# Patient Record
Sex: FEMALE | Race: WHITE | NOT HISPANIC OR LATINO | Employment: FULL TIME | URBAN - METROPOLITAN AREA
[De-identification: names, ages, dates, MRNs, and addresses within clinical notes are randomized per-mention and may not be internally consistent; named-entity substitution may affect disease eponyms.]

---

## 2018-09-04 ENCOUNTER — CLINICAL SUPPORT (OUTPATIENT)
Dept: URGENT CARE | Facility: CLINIC | Age: 23
End: 2018-09-04

## 2018-09-04 DIAGNOSIS — Z02.83 ENCOUNTER FOR DRUG SCREENING: Primary | ICD-10-CM

## 2018-09-04 PROCEDURE — 80305 DRUG TEST PRSMV DIR OPT OBS: CPT | Mod: S$GLB,,, | Performed by: PHYSICIAN ASSISTANT

## 2019-01-16 ENCOUNTER — OFFICE VISIT (OUTPATIENT)
Dept: FAMILY MEDICINE | Facility: CLINIC | Age: 24
End: 2019-01-16
Payer: OTHER GOVERNMENT

## 2019-01-16 VITALS
OXYGEN SATURATION: 99 % | HEART RATE: 80 BPM | HEIGHT: 64 IN | WEIGHT: 128.38 LBS | SYSTOLIC BLOOD PRESSURE: 126 MMHG | DIASTOLIC BLOOD PRESSURE: 84 MMHG | BODY MASS INDEX: 21.92 KG/M2

## 2019-01-16 DIAGNOSIS — M54.50 ACUTE RIGHT-SIDED LOW BACK PAIN WITHOUT SCIATICA: Primary | ICD-10-CM

## 2019-01-16 DIAGNOSIS — M62.838 MUSCLE SPASM: ICD-10-CM

## 2019-01-16 PROCEDURE — 99203 OFFICE O/P NEW LOW 30 MIN: CPT | Mod: ,,, | Performed by: NURSE PRACTITIONER

## 2019-01-16 PROCEDURE — 99203 PR OFFICE/OUTPT VISIT, NEW, LEVL III, 30-44 MIN: ICD-10-PCS | Mod: ,,, | Performed by: NURSE PRACTITIONER

## 2019-01-16 RX ORDER — IBUPROFEN 800 MG/1
800 TABLET ORAL 3 TIMES DAILY
Qty: 30 TABLET | Refills: 1 | Status: SHIPPED | OUTPATIENT
Start: 2019-01-16

## 2019-01-16 RX ORDER — TRAZODONE HYDROCHLORIDE 50 MG/1
50 TABLET ORAL NIGHTLY
COMMUNITY
End: 2019-03-27

## 2019-01-16 RX ORDER — CYCLOBENZAPRINE HCL 5 MG
5 TABLET ORAL NIGHTLY
Qty: 20 TABLET | Refills: 0 | Status: SHIPPED | OUTPATIENT
Start: 2019-01-16 | End: 2019-01-26

## 2019-01-16 RX ORDER — IBUPROFEN 200 MG
200 TABLET ORAL EVERY 6 HOURS PRN
COMMUNITY
End: 2019-01-16

## 2019-01-16 NOTE — PATIENT INSTRUCTIONS
Back Care Tips    Caring for your back  These are things you can do to prevent a recurrence of acute back pain and to reduce symptoms from chronic back pain:  · Maintain a healthy weight. If you are overweight, losing weight will help most types of back pain.  · Exercise is an important part of recovery from most types of back pain. The muscles behind and in front of the spine support the back. This means strengthening both the back muscles and the abdominal muscles will provide better support for your spine.   · Swimming and brisk walking are good overall exercises to improve your fitness level.  · Practice safe lifting methods (below).  · Practice good posture when sitting, standing and walking. Avoid prolonged sitting. This puts more stress on the lower back than standing or walking.  · Wear quality shoes with sufficient arch support. Foot and ankle alignment can affect back symptoms. Women should avoid wearing high heels.  · Therapeutic massage can help relax the back muscles without stretching them.  · During the first 24 to 72 hours after an acute injury or flare-up of chronic back pain, apply an ice pack to the painful area for 20 minutes and then remove it for 20 minutes, over a period of 60 to 90 minutes, or several times a day. As a safety precaution, do not use a heating pad at bedtime. Sleeping on a heating pad can lead to skin burns or tissue damage.  · You can alternate ice and heat therapies.  Medications  Talk to your healthcare provider before using medicines, especially if you have other medical problems or are taking other medicines.  · You may use acetaminophen or ibuprofen to control pain, unless your healthcare provider prescribed other pain medicine. If you have chronic conditions like diabetes, liver or kidney disease, stomach ulcers, or gastrointestinal bleeding, or are taking blood thinners, talk with your healthcare provider before taking any medicines.  · Be careful if you are given  prescription pain medicines, narcotics, or medicine for muscle spasm. They can cause drowsiness, affect your coordination, reflexes, and judgment. Do not drive or operate heavy machinery while taking these types of medicines. Take prescription pain medicine only as prescribed by your healthcare provider.  Lumbar stretch  Here is a simple stretching exercise that will help relax muscle spasm and keep your back more limber. If exercise makes your back pain worse, dont do it.  · Lie on your back with your knees bent and both feet on the ground.  · Slowly raise your left knee to your chest as you flatten your lower back against the floor. Hold for 5 seconds.  · Relax and repeat the exercise with your right knee.  · Do 10 of these exercises for each leg.  Safe lifting method  · Dont bend over at the waist to lift an object off the floor.  Instead, bend your knees and hips in a squat.   · Keep your back and head upright  · Hold the object close to your body, directly in front of you.  · Straighten your legs to lift the object.   · Lower the object to the floor in the reverse fashion.  · If you must slide something across the floor, push it.  Posture tips  Sitting  Sit in chairs with straight backs or low-back support. Keep your knees lower than your hips, with your feet flat on the floor.  When driving, sit up straight. Adjust the seat forward so you are not leaning toward the steering wheel.  A small pillow or rolled towel behind your lower back may help if you are driving long distances.   Standing  When standing for long periods, shift most of your weight to one leg at a time. Alternate legs every few minutes.   Sleeping  The best way to sleep is on your side with your knees bent. Put a low pillow under your head to support your neck in a neutral spine position. Avoid thick pillows that bend your neck to one side. Put a pillow between your legs to further relax your lower back. If you sleep on your back, put pillows  under your knees to support your legs in a slightly flexed position. Use a firm mattress. If your mattress sags, replace it, or use a 1/2-inch plywood board under the mattress to add support.  Follow-up care  Follow up with your healthcare provider, or as advised.  If X-rays, a CT scan or an MRI scan were taken, they will be reviewed by a radiologist. You will be notified of any new findings that may affect your care.  Call 911  Seek emergency medical care if any of the following occur:  · Trouble breathing  · Confusion  · Very drowsy  · Fainting or loss of consciousness  · Rapid or very slow heart rate  · Loss of  bowel or bladder control  When to seek medical care  Call your healthcare provider if any of the following occur:  · Pain becomes worse or spreads to your arms or legs  · Weakness or numbness in one or both arms or legs  · Numbness in the groin area  Date Last Reviewed: 6/1/2016  © 4070-0155 [employer]. [address] [place] [address], [place] [address]. All rights reserved. This information is not intended as a substitute for professional medical care. Always follow your healthcare professional's instructions.

## 2019-01-16 NOTE — PROGRESS NOTES
Subjective:       Patient ID: Jeannette Pope is a 23 y.o. female.    Chief Complaint: Back Pain    Back Pain   This is a new problem. The current episode started 1 to 4 weeks ago (2 weeks). The problem occurs 2 to 4 times per day. The problem has been waxing and waning since onset. The pain is present in the lumbar spine. The quality of the pain is described as shooting and aching. The pain is at a severity of 4/10. The pain is moderate. The symptoms are aggravated by twisting and sitting. Pertinent negatives include no abdominal pain, bladder incontinence, bowel incontinence, chest pain, dysuria, fever, headaches, leg pain, numbness, paresis, paresthesias, pelvic pain, perianal numbness, tingling, weakness or weight loss. 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 She has tried NSAIDs, chiropractic manipulation, heat and bed rest for the symptoms. The treatment provided mild relief.   Spasms   This is a new problem. The current episode started 1 to 4 weeks ago. The problem occurs daily. The problem has been waxing and waning. Associated symptoms include myalgias. Pertinent negatives include no abdominal pain, arthralgias, chest pain, congestion, coughing, fever, headaches, nausea, numbness, rash, sore throat, vomiting or weakness. The symptoms are aggravated by bending and exertion (lifting, twisting). She has tried rest, sleep, position changes, relaxation, NSAIDs, lying down and heat for the symptoms. The treatment provided mild relief.     Review of Systems   Constitutional: Negative for activity change, appetite change, fever and weight loss.        Bmi 22.04   HENT: Negative for congestion, ear discharge, ear pain, sore throat, trouble swallowing and voice change.    Eyes: Negative for photophobia, pain, discharge and visual disturbance.   Respiratory: Negative for cough, chest tightness and shortness of breath.    Cardiovascular: Negative for chest pain and palpitations.   Gastrointestinal: Negative for  abdominal pain, bowel incontinence, nausea and vomiting.   Endocrine: Negative for cold intolerance and heat intolerance.   Genitourinary: Negative for bladder incontinence, difficulty urinating, dysuria and pelvic pain.   Musculoskeletal: Positive for back pain and myalgias. Negative for arthralgias and gait problem.   Skin: Negative for rash.   Allergic/Immunologic: Negative for immunocompromised state.   Neurological: Negative for tingling, speech difficulty, weakness, numbness, headaches and paresthesias.   Psychiatric/Behavioral: Negative for confusion, self-injury and suicidal ideas.       Past Medical History:   Diagnosis Date    Anxiety     Bilateral ovarian cysts 01/16/2019    Migraines 01/16/2019    History reviewed. No pertinent surgical history.    Family History   Problem Relation Age of Onset    Hypertension Mother     Cancer Mother     Hypertension Father        Social History     Socioeconomic History    Marital status: Single     Spouse name: None    Number of children: None    Years of education: None    Highest education level: None   Social Needs    Financial resource strain: None    Food insecurity - worry: None    Food insecurity - inability: None    Transportation needs - medical: None    Transportation needs - non-medical: None   Occupational History    None   Tobacco Use    Smoking status: Never Smoker    Smokeless tobacco: Never Used   Substance and Sexual Activity    Alcohol use: Yes     Alcohol/week: 1.2 oz     Types: 1 Glasses of wine, 1 Shots of liquor per week    Drug use: No    Sexual activity: Yes     Partners: Male     Birth control/protection: IUD   Other Topics Concern    None   Social History Narrative    None       Current Outpatient Medications   Medication Sig Dispense Refill    traZODone (DESYREL) 50 MG tablet Take 50 mg by mouth every evening.      cyclobenzaprine (FLEXERIL) 5 MG tablet Take 1 tablet (5 mg total) by mouth nightly. for 10 days 20  "tablet 0    ibuprofen (ADVIL,MOTRIN) 800 MG tablet Take 1 tablet (800 mg total) by mouth 3 (three) times daily. 30 tablet 1     No current facility-administered medications for this visit.        Review of patient's allergies indicates:  No Known Allergies  Objective:      Blood pressure 126/84, pulse 80, height 5' 4" (1.626 m), weight 58.2 kg (128 lb 6.4 oz), SpO2 99 %. Body mass index is 22.04 kg/m².   Physical Exam   Constitutional: She is oriented to person, place, and time. She appears well-developed and well-nourished. She is cooperative. No distress.   HENT:   Head: Normocephalic and atraumatic.   Right Ear: Tympanic membrane normal.   Left Ear: Tympanic membrane normal.   Mouth/Throat: Oropharynx is clear and moist.   Eyes: Conjunctivae, EOM and lids are normal. Pupils are equal, round, and reactive to light. Lids are everted and swept, no foreign bodies found. Right pupil is round and reactive. Left pupil is round and reactive.   Neck: Trachea normal and normal range of motion. Neck supple.   Cardiovascular: Normal rate, regular rhythm, S1 normal, S2 normal, normal heart sounds and intact distal pulses.   No murmur heard.  Pulmonary/Chest: Effort normal and breath sounds normal. No respiratory distress.   Abdominal: Soft. Bowel sounds are normal. There is no rigidity and no guarding.   Musculoskeletal: Normal range of motion.        Lumbar back: She exhibits pain and spasm.        Back:    Lymphadenopathy:     She has no cervical adenopathy.     She has no axillary adenopathy.   Neurological: She is alert and oriented to person, place, and time.   Skin: Skin is warm and dry. Capillary refill takes less than 2 seconds.   Psychiatric: She has a normal mood and affect. Her behavior is normal. Judgment and thought content normal.   Nursing note and vitals reviewed.          Assessment:       1. Acute right-sided low back pain without sciatica    2. Muscle spasm    3. BMI 22.0-22.9, adult        Plan:     "   Jeannette was seen today for back pain.    Diagnoses and all orders for this visit:    Acute right-sided low back pain without sciatica  -     ibuprofen (ADVIL,MOTRIN) 800 MG tablet; Take 1 tablet (800 mg total) by mouth 3 (three) times daily.  -     Ambulatory Referral to Physical/Occupational Therapy    Muscle spasm  -     cyclobenzaprine (FLEXERIL) 5 MG tablet; Take 1 tablet (5 mg total) by mouth nightly. for 10 days    BMI 22.0-22.9, adult  -Continue current diet and exercise routine.         Patient advised on conservative measures for recovery from back injury.  Yoga and stretching exercises encouraged versus weight lifting and running as exercises.  Patient should follow up with physical therapy for evaluation and treatment of acute back pain.  Patient may take muscle relaxer nightly prn and nsaid's as directed.  Ensure adequate fluid intake.      Follow up in 3 months for annual physical and sooner for back pain prn.

## 2019-03-27 ENCOUNTER — HOSPITAL ENCOUNTER (EMERGENCY)
Facility: HOSPITAL | Age: 24
Discharge: HOME | End: 2019-03-27
Attending: EMERGENCY MEDICINE
Payer: OTHER GOVERNMENT

## 2019-03-27 ENCOUNTER — OFFICE VISIT (OUTPATIENT)
Dept: FAMILY MEDICINE | Facility: CLINIC | Age: 24
End: 2019-03-27
Payer: OTHER GOVERNMENT

## 2019-03-27 VITALS
WEIGHT: 122.38 LBS | HEART RATE: 79 BPM | OXYGEN SATURATION: 99 % | DIASTOLIC BLOOD PRESSURE: 70 MMHG | SYSTOLIC BLOOD PRESSURE: 124 MMHG | HEIGHT: 64 IN | BODY MASS INDEX: 20.89 KG/M2

## 2019-03-27 VITALS
TEMPERATURE: 98 F | RESPIRATION RATE: 18 BRPM | HEIGHT: 63 IN | OXYGEN SATURATION: 100 % | BODY MASS INDEX: 21.62 KG/M2 | DIASTOLIC BLOOD PRESSURE: 79 MMHG | HEART RATE: 82 BPM | WEIGHT: 122 LBS | SYSTOLIC BLOOD PRESSURE: 131 MMHG

## 2019-03-27 DIAGNOSIS — N89.8 VAGINAL DISCHARGE: Primary | ICD-10-CM

## 2019-03-27 DIAGNOSIS — R10.9 FLANK PAIN, ACUTE: ICD-10-CM

## 2019-03-27 DIAGNOSIS — R11.0 NAUSEA: ICD-10-CM

## 2019-03-27 DIAGNOSIS — Z01.419 ENCOUNTER FOR GYNECOLOGICAL EXAMINATION: ICD-10-CM

## 2019-03-27 DIAGNOSIS — R63.4 WEIGHT LOSS: ICD-10-CM

## 2019-03-27 DIAGNOSIS — Z30.431 ENCOUNTER FOR ROUTINE CHECKING OF INTRAUTERINE CONTRACEPTIVE DEVICE (IUD): Primary | ICD-10-CM

## 2019-03-27 DIAGNOSIS — R10.84 GENERALIZED ABDOMINAL PAIN: ICD-10-CM

## 2019-03-27 DIAGNOSIS — Z97.5 IUD (INTRAUTERINE DEVICE) IN PLACE: ICD-10-CM

## 2019-03-27 LAB
B-HCG UR QL: NEGATIVE
BACTERIA GENITAL QL WET PREP: ABNORMAL
BILIRUB UR QL STRIP: NEGATIVE
CLARITY UR: CLEAR
CLUE CELLS VAG QL WET PREP: ABNORMAL
COLOR UR: YELLOW
CTP QC/QA: YES
FILAMENT FUNGI VAG WET PREP-#/AREA: ABNORMAL
GLUCOSE UR QL STRIP: NEGATIVE
HGB UR QL STRIP: ABNORMAL
KETONES UR QL STRIP: NEGATIVE
LEUKOCYTE ESTERASE UR QL STRIP: NEGATIVE
NITRITE UR QL STRIP: NEGATIVE
PH UR STRIP: 6 [PH] (ref 5–8)
PROT UR QL STRIP: NEGATIVE
SP GR UR STRIP: 1.01 (ref 1–1.03)
SPECIMEN SOURCE: ABNORMAL
T VAGINALIS GENITAL QL WET PREP: ABNORMAL
URN SPEC COLLECT METH UR: ABNORMAL
UROBILINOGEN UR STRIP-ACNC: NEGATIVE EU/DL
WBC #/AREA VAG WET PREP: ABNORMAL
YEAST GENITAL QL WET PREP: ABNORMAL

## 2019-03-27 PROCEDURE — 99215 OFFICE O/P EST HI 40 MIN: CPT | Mod: ,,, | Performed by: NURSE PRACTITIONER

## 2019-03-27 PROCEDURE — 81025 URINE PREGNANCY TEST: CPT | Performed by: PHYSICIAN ASSISTANT

## 2019-03-27 PROCEDURE — 87210 SMEAR WET MOUNT SALINE/INK: CPT

## 2019-03-27 PROCEDURE — 25000003 PHARM REV CODE 250: Performed by: PHYSICIAN ASSISTANT

## 2019-03-27 PROCEDURE — 99215 PR OFFICE/OUTPT VISIT, EST, LEVL V, 40-54 MIN: ICD-10-PCS | Mod: ,,, | Performed by: NURSE PRACTITIONER

## 2019-03-27 PROCEDURE — 81003 URINALYSIS AUTO W/O SCOPE: CPT

## 2019-03-27 PROCEDURE — 87491 CHLMYD TRACH DNA AMP PROBE: CPT

## 2019-03-27 PROCEDURE — 99283 EMERGENCY DEPT VISIT LOW MDM: CPT | Mod: 25

## 2019-03-27 RX ORDER — ONDANSETRON 4 MG/1
8 TABLET, ORALLY DISINTEGRATING ORAL
Status: COMPLETED | OUTPATIENT
Start: 2019-03-27 | End: 2019-03-27

## 2019-03-27 RX ORDER — ONDANSETRON 8 MG/1
8 TABLET, ORALLY DISINTEGRATING ORAL 3 TIMES DAILY PRN
Qty: 20 TABLET | Refills: 0 | Status: SHIPPED | OUTPATIENT
Start: 2019-03-27

## 2019-03-27 RX ADMIN — ONDANSETRON 8 MG: 4 TABLET, ORALLY DISINTEGRATING ORAL at 12:03

## 2019-03-27 NOTE — PROGRESS NOTES
Subjective:       Patient ID: Jeannette Pope is a 23 y.o. female.    Chief Complaint: Referral (OB/GYN) and Vaginal Bleeding (poss. r/t IUD)    Patient presents today with abdominal pain, four-week history of vaginal bleeding that is worsening, nausea, abdominal pain, and flank pain. Patient states that she obtained a copper IUD for birth control in December 2018. Patient had displaced in [place] where she was living at the time. Patient states that her periods ever the last 3 months of been slightly heavier but have been on time and she has not had any more severe cramps or complications due to IUD placement. Patient is  and is sexually active. Patient denies fever but has stated over the last week she has become excessively fatigued, nauseated, and cannot eat. Patient denies vomiting but states that she has not eaten any food really over the last 4 days. Patient states she has lost about 5 pounds of weight. Patient's BMI currently is 21.61.  Patient has taken nothing for that pain and has not established with gynecology and area at this time. Patient states that she has felt that bad enough and considered going to the emergency room but instead schedule the appointment today to get a second opinion and evaluation with referral for gynecology.    Review of Systems   Constitutional: Positive for chills and fatigue. Negative for activity change, appetite change, fever and unexpected weight change.        BMI 21.61   HENT: Negative for congestion, ear discharge, ear pain, hearing loss, rhinorrhea, sore throat, trouble swallowing and voice change.    Eyes: Negative for photophobia, pain, discharge and visual disturbance.   Respiratory: Negative for cough, chest tightness, shortness of breath and wheezing.    Cardiovascular: Negative for chest pain and palpitations.   Gastrointestinal: Positive for abdominal pain. Negative for blood in stool, constipation, nausea and vomiting.   Endocrine: Negative for cold  intolerance, heat intolerance, polydipsia and polyuria.   Genitourinary: Positive for flank pain, menstrual problem, pelvic pain and vaginal bleeding. Negative for difficulty urinating, dysuria and hematuria.   Musculoskeletal: Negative for arthralgias, gait problem, joint swelling and neck pain.   Skin: Negative for rash.   Allergic/Immunologic: Negative for immunocompromised state.   Neurological: Negative for speech difficulty, weakness and headaches.   Psychiatric/Behavioral: Negative for confusion, dysphoric mood, self-injury and suicidal ideas.       Past Medical History:   Diagnosis Date    Anxiety     Bilateral ovarian cysts 01/16/2019    Migraines 01/16/2019    History reviewed. No pertinent surgical history.    Family History   Problem Relation Age of Onset    Hypertension Mother     Cancer Mother     Hypertension Father        Social History     Socioeconomic History    Marital status: Single     Spouse name: None    Number of children: None    Years of education: None    Highest education level: None   Occupational History    None   Social Needs    Financial resource strain: None    Food insecurity:     Worry: None     Inability: None    Transportation needs:     Medical: None     Non-medical: None   Tobacco Use    Smoking status: Never Smoker    Smokeless tobacco: Never Used   Substance and Sexual Activity    Alcohol use: Yes     Alcohol/week: 1.2 oz     Types: 1 Glasses of wine, 1 Shots of liquor per week    Drug use: No    Sexual activity: Yes     Partners: Male     Birth control/protection: IUD   Lifestyle    Physical activity:     Days per week: None     Minutes per session: None    Stress: None   Relationships    Social connections:     Talks on phone: None     Gets together: None     Attends [employer] service: None     Active member of club or organization: None     Attends meetings of clubs or organizations: None     Relationship status: None    Intimate partner violence:  "    Fear of current or ex partner: None     Emotionally abused: None     Physically abused: None     Forced sexual activity: None   Other Topics Concern    None   Social History Narrative    None       No current facility-administered medications for this visit.      Current Outpatient Medications   Medication Sig Dispense Refill    ibuprofen (ADVIL,MOTRIN) 800 MG tablet Take 1 tablet (800 mg total) by mouth 3 (three) times daily. (Patient taking differently: Take 800 mg by mouth 3 (three) times daily as needed. ) 30 tablet 1       Review of patient's allergies indicates:  No Known Allergies  Objective:    HPI     Referral      Additional comments: OB/GYN              Vaginal Bleeding      Additional comments: poss. r/t IUD          Last edited by Chelsie Atwood LPN on 3/27/2019  9:17 AM. (History)      Blood pressure 124/70, pulse 79, height 5' 4" (1.626 m), weight 55.5 kg (122 lb 6.4 oz), SpO2 99 %. Body mass index is 21.01 kg/m².   Physical Exam   Constitutional: She is oriented to person, place, and time. She appears well-developed. She is cooperative. She appears ill. No distress.   HENT:   Head: Normocephalic and atraumatic.   Right Ear: Tympanic membrane normal.   Left Ear: Tympanic membrane normal.   Nose: Nose normal.   Mouth/Throat: Oropharynx is clear and moist.   Eyes: Pupils are equal, round, and reactive to light. Conjunctivae, EOM and lids are normal. Lids are everted and swept, no foreign bodies found. Right pupil is round and reactive. Left pupil is round and reactive.   Neck: Trachea normal and normal range of motion. Neck supple.   Cardiovascular: Normal rate, regular rhythm, S1 normal, S2 normal, normal heart sounds and intact distal pulses.   No murmur heard.  Pulmonary/Chest: Effort normal and breath sounds normal. No respiratory distress. She has no decreased breath sounds. She has no wheezes. She has no rhonchi. She has no rales. She exhibits no tenderness.   Abdominal: Soft. Normal " appearance and bowel sounds are normal. She exhibits no mass. There is generalized tenderness. There is no rigidity, no rebound, no guarding, no CVA tenderness, no tenderness at McBurney's point and negative Hernandez's sign.   Musculoskeletal: Normal range of motion.   Lymphadenopathy:     She has no cervical adenopathy.        Right cervical: No superficial cervical adenopathy present.       Left cervical: No superficial cervical adenopathy present.     She has no axillary adenopathy.   Neurological: She is alert and oriented to person, place, and time.   Skin: Skin is warm and dry. Capillary refill takes less than 2 seconds.   Psychiatric: She has a normal mood and affect. Her speech is normal and behavior is normal. Judgment and thought content normal. Cognition and memory are normal.   Nursing note and vitals reviewed.          Assessment:       1. Encounter for routine checking of intrauterine contraceptive device (IUD)    2. Encounter for gynecological examination    3. Nausea    4. Weight loss    5. Generalized abdominal pain    6. Flank pain, acute    7. BMI 21.0-21.9, adult        Plan:       Jeannette was seen today for referral and vaginal bleeding.    Diagnoses and all orders for this visit:    Encounter for routine checking of intrauterine contraceptive device (IUD)  -     Ambulatory referral to Obstetrics / Gynecology    Encounter for gynecological examination  -     Ambulatory referral to Obstetrics / Gynecology    Nausea  -Patient is advised to go to the ER immediately due to symptoms and high chance of common locations of IUD.    Weight loss  -Patient is advised to go to the ER immediately due to symptoms and high chance of common locations of IUD.    Generalized abdominal pain  -Patient is advised to go to the ER immediately due to symptoms and high chance of common locations of IUD.    Flank pain, acute  -Patient is advised to go to the ER immediately due to symptoms and high chance of common locations  of IUD.  Patient denies urinary frequency or dysuria.    BMI 21.0-21.9, adult  -Continue liquid diet for now. Follow-up with ER immediately.         Patient has been referred to gynecology to establish care and further manage birth control options. Patient is advised to go the ER immediately due to severity of condition at this time. Patient states she will go to [facility] ER right now.  Patient agrees to going to the ER now.

## 2019-03-27 NOTE — PATIENT INSTRUCTIONS
Birth Control: IUD (Intrauterine Device)    The IUD (intrauterine device) is small, flexible, and T-shaped. A trained healthcare provider places it in the uterus. The IUD is one of the most effective birth control methods. It is also reversible. This means it can be removed at any time by a trained healthcare provider. New IUDs are safe and do not have the risks of older types of IUDs.  Pregnancy rates  Talk to your healthcare provider about the effectiveness of this birth control method.  Types of IUDs  IUD insertion is done in the healthcare providers office. Two types of IUDs are available:  · The copper IUD releases a small amount of copper into the uterus. The copper makes it harder for sperm to reach the egg. The device works for at least 10 years.  · The progestin IUD releases a hormone called progestin. It causes changes in the uterus to help prevent pregnancy. The device works for 3 to 5 years, depending on which device is chosen. It may be recommended for women who have anemia or heavy and painful periods.  IUDs have thin strings that hang from the opening of the uterus into the vagina. This lets you check that the IUD stays in place.  Things to know about IUDs  · IUDs can be used by women who have never been pregnant or by women with a history of sexually transmitted infections (STIs) or tubal pregnancy.  · It won't move from the uterus to any other part of the body.  · There is a slight risk of the device coming out of the vagina (expulsion).  · It may not work in women who have an abnormally shaped uterus.  · A copper IUD may cause heavier periods and cramping.  · Progestin IUD may cause light periods or no periods at all (irregular bleeding or spotting is possible and normal during first 3 to 6 months).  · If you get a sexually transmitted infection with an IUD in place, symptoms may be more severe.  What to report to your healthcare provider  Be sure your healthcare provider knows if you  have:  · A sexually transmitted infection (STI) or possible STI  · Liver problems  · Blood clots (for progestin IUD only)  · Breast cancer or a history of breast cancer (progestin IUD only)   Date Last Reviewed: 3/1/2017  © 2711-9588 [employer]. [address] [place] [address]. All rights reserved. This information is not intended as a substitute for professional medical care. Always follow your healthcare professional's instructions.

## 2019-03-27 NOTE — ED NOTES
IUD placed in Dec 2018. Pt states she has recently had prolonged bleeding and now bleeding has stopped and she has vaginal dc. Unable to find strings from IUD and check placement. Sent from PCP.

## 2019-03-27 NOTE — ED PROVIDER NOTES
Encounter Date: 3/27/2019    SCRIBE #1 NOTE: Alyson MCMAHON am scribing for, and in the presence of, Oma Corbin PA-C.       History     Chief Complaint   Patient presents with    Vaginal Bleeding     x 4 weeks     Abdominal Pain    Nausea       Time seen by provider: 11:20 AM on 03/27/2019    Jeannette Pope is a 23 y.o. female with a PMHx of bilateral ovarian cysts who presents to the ED with a sudden onset of constant vaginal bleeding that began four weeks ago. She explains that it has been heavy bleeding at times and spotting at other times. Pt had a IUD placed in December of 2018. Four days ago, she began having back pain that radiates into her abdomen. Due to the pain, she became nauseated. She endorses appetite change and thick white vaginal discharge. The patient denies any other symptoms at this time. No pertinent PSHx noted. No pertinent SHx noted. No known drug allergies noted.     The history is provided by the patient.     Review of patient's allergies indicates:  No Known Allergies  Past Medical History:   Diagnosis Date    Anxiety     Bilateral ovarian cysts 01/16/2019    Migraines 01/16/2019     History reviewed. No pertinent surgical history.  Family History   Problem Relation Age of Onset    Hypertension Mother     Cancer Mother     Hypertension Father      Social History     Tobacco Use    Smoking status: Never Smoker    Smokeless tobacco: Never Used   Substance Use Topics    Alcohol use: Yes     Alcohol/week: 1.2 oz     Types: 1 Glasses of wine, 1 Shots of liquor per week    Drug use: No     Review of Systems   Constitutional: Positive for appetite change. Negative for chills and fever.   HENT: Negative for facial swelling and trouble swallowing.    Eyes: Negative for discharge.   Respiratory: Negative for cough, chest tightness, shortness of breath and wheezing.    Cardiovascular: Negative for chest pain and palpitations.   Gastrointestinal: Positive for abdominal pain  and nausea. Negative for diarrhea and vomiting.   Genitourinary: Positive for vaginal bleeding and vaginal discharge. Negative for dysuria and hematuria.   Musculoskeletal: Positive for back pain. Negative for arthralgias, joint swelling, myalgias, neck pain and neck stiffness.   Skin: Negative for color change, pallor, rash and wound.   Neurological: Negative for dizziness, syncope, weakness, light-headedness, numbness and headaches.   Hematological: Does not bruise/bleed easily.   Psychiatric/Behavioral: The patient is not nervous/anxious.    All other systems reviewed and are negative.      Physical Exam     Initial Vitals [03/27/19 1012]   BP Pulse Resp Temp SpO2   (!) 146/84 84 18 97.8 °F (36.6 °C) 100 %      MAP       --         Physical Exam    Nursing note and vitals reviewed.  Constitutional: She appears well-developed and well-nourished. She is not diaphoretic. No distress.   HENT:   Head: Normocephalic and atraumatic.   Eyes: Conjunctivae are normal.   Cardiovascular: Normal rate, regular rhythm, normal heart sounds and intact distal pulses.   Pulmonary/Chest: Breath sounds normal. No respiratory distress. She has no wheezes. She has no rhonchi. She has no rales.   Abdominal: Soft. She exhibits no distension and no mass. There is no tenderness.   Genitourinary: Pelvic exam was performed with patient supine. There is no rash, tenderness, lesion or injury on the right labia. There is no rash, tenderness, lesion or injury on the left labia. Right adnexum displays no mass and no tenderness. Left adnexum displays no mass and no tenderness. No bleeding in the vagina. Vaginal discharge found.   Genitourinary Comments: Small amount of white vaginal discharge.  No vaginal bleeding noted. IUD strings well visualized from cervix.  No adnexal tenderness or masses noted.  No CMT.    Musculoskeletal: Normal range of motion. She exhibits no edema or tenderness.   Neurological: She is alert and oriented to person, place,  and time. She has normal strength. No sensory deficit.   Skin: Skin is warm and dry. No rash and no abscess noted. No erythema.         ED Course   Procedures  Labs Reviewed   URINALYSIS, REFLEX TO URINE CULTURE - Abnormal; Notable for the following components:       Result Value    Occult Blood UA Trace (*)     All other components within normal limits    Narrative:     Preferred Collection Type->Urine, Clean Catch   VAGINAL SCREEN - Abnormal; Notable for the following components:    WBC - Vaginal Screen Many (*)     Bacteria - Vaginal Screen Many (*)     All other components within normal limits   C. TRACHOMATIS/N. GONORRHOEAE BY AMP DNA   POCT URINE PREGNANCY          Imaging Results    None          Medical Decision Making:   History:   Old Medical Records: I decided to obtain old medical records.  Differential Diagnosis:   UTI  TOA  PID  IUD migration    Clinical Tests:   Lab Tests: Ordered and Reviewed       APC / Resident Notes:   IUD strings are visible from the cervix.  Patient declines ultrasound for confirmation of placement.  Low suspicion for TOA or PID and no need for further imaging or testing at this time.  GC chlamydia test are sent, but not suspected and no empiric treatment is given today.  Wet prep is negative.  Urinalysis is negative. We feel comfortable discharging her home to follow up with gynecology for re-evaluation and further treatment options.  She voices understanding and is agreeable to the plan.  She is given specific return precautions.       Scribe Attestation:   Scribe #1: I performed the above scribed service and the documentation accurately describes the services I performed. I attest to the accuracy of the note.    I, Oma Corbin PA-C, personally performed the services described in this documentation. All medical record entries made by the scribe were at my direction and in my presence.  I have reviewed the chart and agree that the record reflects my personal performance  and is accurate and complete. Oma Corbin PA-C.  5:13 PM 03/27/2019             Clinical Impression:       ICD-10-CM ICD-9-CM   1. Vaginal discharge N89.8 623.5   2. IUD (intrauterine device) in place Z97.5 V45.51         Disposition:   Disposition: Discharged  Condition: Stable                        Oma Corbin PA-C  03/27/19 1713

## 2019-03-28 LAB
C TRACH DNA SPEC QL NAA+PROBE: NOT DETECTED
N GONORRHOEA DNA SPEC QL NAA+PROBE: NOT DETECTED